# Patient Record
Sex: FEMALE | Race: WHITE
[De-identification: names, ages, dates, MRNs, and addresses within clinical notes are randomized per-mention and may not be internally consistent; named-entity substitution may affect disease eponyms.]

---

## 2017-06-01 ENCOUNTER — HOSPITAL ENCOUNTER (EMERGENCY)
Dept: HOSPITAL 25 - ED | Age: 77
Discharge: HOME | End: 2017-06-01
Payer: MEDICARE

## 2017-06-01 VITALS — DIASTOLIC BLOOD PRESSURE: 70 MMHG | SYSTOLIC BLOOD PRESSURE: 109 MMHG
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
## 2017-06-01 DIAGNOSIS — Z88.0: ICD-10-CM

## 2017-06-01 DIAGNOSIS — Z87.891: ICD-10-CM

## 2017-06-01 DIAGNOSIS — R73.03: ICD-10-CM

## 2017-06-01 DIAGNOSIS — Z90.10: ICD-10-CM
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
## 2017-06-01 DIAGNOSIS — S05.02XA: Primary | ICD-10-CM

## 2017-06-01 DIAGNOSIS — M81.0: ICD-10-CM

## 2017-06-01 DIAGNOSIS — Z85.3: ICD-10-CM

## 2017-06-01 DIAGNOSIS — I10: ICD-10-CM

## 2017-06-01 PROCEDURE — 99281 EMR DPT VST MAYX REQ PHY/QHP: CPT

## 2017-06-01 NOTE — ED
Throat Pain/Nasal Congestion





- HPI Summary


HPI Summary: 


Pt here w/ Lt eye irritation since 20:00 last night. Was weeding her garden 

when she felt a piece of dirt fly into her eye. Has had irritation here since - 

feels like something is in there. 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 

her vision is - can't keep eye open long enough to tell. Tried flushing with 

glaucoma drops she has at home. Otherwise, wears glasses. Seasonal allergies - 

has been sneezing w/ rhinorrhea during this spring season. Denies fever, chills

, HA, neck pain, HA.





- History of Current Complaint


Chief Complaint: EDEyeProblem


Time Seen by Provider: 06/01/17 08:32


Hx Obtained From: Patient





- Allergies/Home Medications


Allergies/Adverse Reactions: 


 Allergies











Allergy/AdvReac Type Severity Reaction Status Date / Time


 


Penicillins Allergy Mild Rash Verified 06/01/17 08:29














PMH/Surg Hx/FS Hx/Imm Hx


Previously Healthy: Yes


Endocrine/Hematology History: Reports: Hx Diabetes - "pre-diabetes"


   Denies: Hx Anticoagulant Therapy


Cardiovascular History: Reports: Hx Hypertension


Musculoskeletal History: Reports: Hx Osteoporosis


Sensory History: Reports: Hx Contacts or Glasses, Hx Glaucoma - followed by 

[facility]


Opthamlomology History: Reports: Hx Contacts or Glasses





- Cancer History


Cancer Type, Location and Year: Breast CA.


Hx Chemotherapy: No


Hx Radiation Therapy: No





- Surgical History


Surgery Procedure, Year, and Place: MASTECTOMY


Infectious Disease History: No


Infectious Disease History: 


   Denies: Hx of Known/Suspected MRSA, Traveled Outside the [place] in Last 30 Days





- Social History


Occupation: Employed Full-time - elected official


Alcohol Use: Daily


Alcohol Amount: 2 DRINKS


Hx Substance Use: No


Substance Use Type: Reports: None


Hx Tobacco Use: Yes


Smoking Status (MU): Former Smoker





Review of Systems


Constitutional: Negative


Eyes: Other - see HPI


ENT: Negative


Negative: Chest Pain


Negative: Shortness Of Breath


Positive: no symptoms reported


Negative: Rash


Neurological: Negative


Psychological: Normal


All Other Systems Reviewed And Are Negative: Yes





Physical Exam


Triage Information Reviewed: Yes


Vital Signs On Initial Exam: 


 Initial Vitals











Temp Pulse Resp BP Pulse Ox


 


 96.8 F   83   20   155/62   98 


 


 06/01/17 08:18  06/01/17 08:18  06/01/17 08:18  06/01/17 08:18  06/01/17 08:18











Vital Signs Reviewed: Yes


Appearance: Positive: Well-Appearing, Well-Nourished, Pain Distress - pt is 

resting in stretcher w/ Lt eye closed - watering at times - she blots dry 

intermittently


Skin: Positive: Warm, Dry - lids of Lt eye are w/ mild erythema compared to Rt


Head/Face: Positive: Normal Head/Face Inspection


Eyes: Positive: EOMI, DAVID, Conjunctiva Inflammed, Discharge - watery from Lt, 

Other: - no russell FB observed - fluoresceine test reveals a 2mm superficial 

abrasion to the superior aspect of the cornea  - no other areas of abrasion - (-

) Siedel's sign


ENT: Positive: Normal ENT inspection, Hearing grossly normal, Pharynx normal


Neck: Positive: Supple, Nontender


Respiratory/Lung Sounds: Positive: Breath Sounds Present


Cardiovascular: Positive: Normal


Musculoskeletal: Positive: Normal, Strength/ROM Intact


Neurological: Positive: Normal, Sensory/Motor Intact, Alert, Oriented to Person 

Place, Time, CN Intact II-III


Psychiatric: Positive: Normal





Procedures





- Eye Procedure


Alcaine Drops Administered: No - tetracaine - relieved pain


Eye Irrigated w/ Saline (ccs): 2 - see PE for details





Diagnostics





- Vital Signs


 Vital Signs











  Temp Pulse Resp BP Pulse Ox


 


 06/01/17 08:31   75    95


 


 06/01/17 08:30     145/57 


 


 06/01/17 08:21  96.9 F  78  20  155/62  97


 


 06/01/17 08:18  96.8 F  83  20  155/62  98














- Laboratory


Lab Statement: Any lab studies that have been ordered have been reviewed, and 

results considered in the medical decision making process.





Re-Evaluation





- Re-Evaluation


  ** First Eval


Change: Improved





EENT Course/Dx





- Diagnoses


Provider Diagnoses: 


 Left corneal abrasion








Discharge





- Discharge Plan


Condition: Stable


Disposition: HOME


Prescriptions: 


Erythromycin OPHTH.OINT* [Ilotycin OPHTH.OINT*] 1 applic LEFT EYE QID #1 tube


Patient Education Materials:  Corneal Abrasion  (ED)


Referrals: 


Ben Joseph MD [Medical Doctor] - 


Additional Instructions: 


Apply antibiotic eye ointment 4-6 x day. You may rinse with saline prior to 

each antibiotic application. Do not wear contacts or eye makeup until cleared 

to do so by eye specialist. Follow-up with Dr. Joseph later today or tomorrow 

for recheck.


*If you develop acute change in vision, swelling, pain with eye movement, fever

, headache, neck pain, return to ED

## 2017-11-13 ENCOUNTER — HOSPITAL ENCOUNTER (EMERGENCY)
Dept: HOSPITAL 25 - ED | Age: 77
Discharge: HOME | End: 2017-11-13
Payer: MEDICARE

## 2017-11-13 VITALS — SYSTOLIC BLOOD PRESSURE: 132 MMHG | DIASTOLIC BLOOD PRESSURE: 77 MMHG

## 2017-11-13 DIAGNOSIS — N39.0: Primary | ICD-10-CM

## 2017-11-13 DIAGNOSIS — Z87.891: ICD-10-CM

## 2017-11-13 PROCEDURE — 87086 URINE CULTURE/COLONY COUNT: CPT

## 2017-11-13 PROCEDURE — 81003 URINALYSIS AUTO W/O SCOPE: CPT

## 2017-11-13 PROCEDURE — 81015 MICROSCOPIC EXAM OF URINE: CPT

## 2017-11-13 PROCEDURE — 99282 EMERGENCY DEPT VISIT SF MDM: CPT

## 2017-11-13 NOTE — ED
Zulma DELGADO Nilda, scribed for James Fonseca MD on 11/13/17 at 1333 .





GI/ HPI





- HPI Summary


HPI Summary: 


This patient is a 77 year old F presenting to [facility] with a chief complaint of 

constant urinary urgency for the past two weeks. The patient rates the pain 0/

10 in severity. Symptoms aggravated and alleviated by nothing. Patient reports 

urinary frequency, but denies urinary pain, fever, and chills. She states she 

has a bladder infection and was previously treated for bladder infection with 

Sulfa pills. PSHx mascectomy and hysterectomy. PMHx cervical and breast cancer, 

and inguinal hernia.





- History of Current Complaint


Chief Complaint: EDUrogenitalProblems


Time Seen by Provider: 11/13/17 13:21


Stated Complaint: UNABLE TO HOLD URINE


Hx Obtained From: Patient


Onset/Duration: Started Weeks Ago - 2 weeks, Still Present


Timing: Constant, Lasting Weeks


Pain Intensity: 0


Additional Signs & Symptoms: Positive: Other: - urinary urgency and frequency; 

negative urinary pain, fever, chills


Aggravating Factor(s): Nothing


Alleviating Factor(s): Nothing





- Allergy/Home Medications


Allergies/Adverse Reactions: 


 Allergies











Allergy/AdvReac Type Severity Reaction Status Date / Time


 


Penicillins Allergy Mild Rash Verified 06/01/17 08:29














PMH/Surg Hx/FS Hx/Imm Hx


Endocrine/Hematology History: Reports: Hx Diabetes - "pre-diabetes"


   Denies: Hx Anticoagulant Therapy


Cardiovascular History: Reports: Hx Hypertension


GI History: Reports: Other GI Disorders - inguinal hernia


Musculoskeletal History: Reports: Hx Osteoporosis


Sensory History: Reports: Hx Contacts or Glasses, Hx Glaucoma - followed by 

[facility]


Opthamlomology History: Reports: Hx Contacts or Glasses, Hx Glaucoma - followed 

by [facility]





- Cancer History


Cancer Type, Location and Year: Breast CA. Cervical cancer.


Hx Chemotherapy: No


Hx Radiation Therapy: No





- Surgical History


Surgery Procedure, Year, and Place: ~1997 MASTECTOMY


Infectious Disease History: No


Infectious Disease History: 


   Denies: Hx of Known/Suspected MRSA, Traveled Outside the [place] in Last 30 Days





- Family History


Known Family History: Positive: Other - cancer





- Social History


Occupation: Employed Full-time


Alcohol Use: Daily


Alcohol Amount: 2 DRINKS


Hx Substance Use: No


Substance Use Type: Reports: None


Hx Tobacco Use: Yes


Smoking Status (MU): Former Smoker





Review of Systems


Negative: Fever, Chills


Positive: frequency, urgency.  Negative: pain


All Other Systems Reviewed And Are Negative: Yes





Physical Exam


Triage Information Reviewed: Yes


Vital Signs On Initial Exam: 


 Initial Vitals











Temp Pulse Resp BP Pulse Ox


 


 97.2 F   92   20   132/77   94 


 


 11/13/17 12:40  11/13/17 12:40  11/13/17 12:40  11/13/17 12:40  11/13/17 12:40











Vital Signs Reviewed: Yes


Appearance: Positive: Well-Appearing, No Pain Distress


Skin: Positive: Warm, Skin Color Reflects Adequate Perfusion


Head/Face: Positive: Normal Head/Face Inspection


Eyes: Positive: EOMI


ENT: Positive: Normal ENT inspection


Neck: Positive: Nontender


Respiratory/Lung Sounds: Positive: Clear to Auscultation, Breath Sounds Present


Cardiovascular: Positive: RRR.  Negative: Murmur


Abdomen Description: Positive: Nontender


Musculoskeletal: Positive: Strength/ROM Intact


Neurological: Positive: Sensory/Motor Intact, Alert, Oriented to Person Place, 

Time, CN Intact II-III


Psychiatric: Positive: Normal





- Taylor Coma Scale


Best Eye Response: 4 - Spontaneous


Best Motor Response: 6 - Obeys Commands


Best Verbal Response: 5 - Oriented





Diagnostics





- Vital Signs


 Vital Signs











  Temp Pulse Resp BP Pulse Ox


 


 11/13/17 12:40  97.2 F  92  20  132/77  94














- Laboratory


Lab Statement: Any lab studies that have been ordered have been reviewed, and 

results considered in the medical decision making process.





GIGU Course/Dx





- Course


Course Of Treatment: This patient is a 77 year old F presenting to [facility] with a 

chief complaint of constant urinary urgency for the past two weeks. The patient 

rates the pain 0/10 in severity.  Symptoms aggravated and alleviated by 

nothing. Patient reports urinary frequency, but denies urinary pain, fever, and 

chills. She states she has a bladder infection and was previously treated for 

bladder infection with Sulfa pills.





- Diagnoses


Provider Diagnoses: 


 UTI (urinary tract infection)








Discharge





- Discharge Plan


Condition: Good


Disposition: HOME


Prescriptions: 


Sulfamethox/Trimethoprim DS* [Bactrim /160 TAB*] 1 tab PO BID #14 tab


Patient Education Materials:  Urinary Tract Infection in Women (ED)


Referrals: 


Stacey Marie MD [Primary Care Provider] - 2 Days





The documentation as recorded by the Zulma pike Nilda accurately 

reflects the service I personally performed and the decisions made by me, James Fonseca MD.

## 2019-06-16 ENCOUNTER — HOSPITAL ENCOUNTER (EMERGENCY)
Dept: HOSPITAL 25 - ED | Age: 79
Discharge: HOME | End: 2019-06-16
Payer: MEDICARE

## 2019-06-16 VITALS — DIASTOLIC BLOOD PRESSURE: 95 MMHG | SYSTOLIC BLOOD PRESSURE: 136 MMHG

## 2019-06-16 DIAGNOSIS — Z85.3: ICD-10-CM

## 2019-06-16 DIAGNOSIS — N39.0: Primary | ICD-10-CM

## 2019-06-16 DIAGNOSIS — Z87.891: ICD-10-CM

## 2019-06-16 DIAGNOSIS — R53.83: ICD-10-CM

## 2019-06-16 DIAGNOSIS — Z79.899: ICD-10-CM

## 2019-06-16 DIAGNOSIS — R50.9: ICD-10-CM

## 2019-06-16 DIAGNOSIS — Z88.0: ICD-10-CM

## 2019-06-16 DIAGNOSIS — R19.7: ICD-10-CM

## 2019-06-16 LAB
ALBUMIN SERPL BCG-MCNC: 4.2 G/DL (ref 3.2–5.2)
ALBUMIN/GLOB SERPL: 1.3 {RATIO} (ref 1–3)
ALP SERPL-CCNC: 77 U/L (ref 34–104)
ALT SERPL W P-5'-P-CCNC: 94 U/L (ref 7–52)
ANION GAP SERPL CALC-SCNC: 10 MMOL/L (ref 2–11)
APTT PPP: 35.3 SECONDS (ref 26–38)
AST SERPL-CCNC: 78 U/L (ref 13–39)
BASOPHILS # BLD AUTO: 0 10^3/UL (ref 0–0.2)
BUN SERPL-MCNC: 14 MG/DL (ref 6–24)
BUN/CREAT SERPL: 17.9 (ref 8–20)
CALCIUM SERPL-MCNC: 9.3 MG/DL (ref 8.6–10.3)
CHLORIDE SERPL-SCNC: 98 MMOL/L (ref 101–111)
EOSINOPHIL # BLD AUTO: 0 10^3/UL (ref 0–0.6)
GLOBULIN SER CALC-MCNC: 3.3 G/DL (ref 2–4)
GLUCOSE SERPL-MCNC: 160 MG/DL (ref 70–100)
HCO3 SERPL-SCNC: 25 MMOL/L (ref 22–32)
HCT VFR BLD AUTO: 42 % (ref 35–47)
HGB BLD-MCNC: 14.7 G/DL (ref 12–16)
INR PPP/BLD: 1.29 (ref 0.82–1.09)
LYMPHOCYTES # BLD AUTO: 0.3 10^3/UL (ref 1–4.8)
MCH RBC QN AUTO: 31 PG (ref 27–31)
MCHC RBC AUTO-ENTMCNC: 35 G/DL (ref 31–36)
MCV RBC AUTO: 89 FL (ref 80–97)
MONOCYTES # BLD AUTO: 0.5 10^3/UL (ref 0–0.8)
NEUTROPHILS # BLD AUTO: 8.9 10^3/UL (ref 1.5–7.7)
NRBC # BLD AUTO: 0 10^3/UL
NRBC BLD QL AUTO: 0.1
PLATELET # BLD AUTO: 130 10^3/UL (ref 150–450)
POTASSIUM SERPL-SCNC: 3.7 MMOL/L (ref 3.5–5)
PROT SERPL-MCNC: 7.5 G/DL (ref 6.4–8.9)
RBC # BLD AUTO: 4.77 10^6 /UL (ref 3.7–4.87)
RBC UR QL AUTO: (no result)
SODIUM SERPL-SCNC: 133 MMOL/L (ref 135–145)
VIT C UR QL: (no result)
WBC # BLD AUTO: 9.9 10^3/UL (ref 3.5–10.8)
WBC UR QL AUTO: (no result)

## 2019-06-16 PROCEDURE — 87077 CULTURE AEROBIC IDENTIFY: CPT

## 2019-06-16 PROCEDURE — 99283 EMERGENCY DEPT VISIT LOW MDM: CPT

## 2019-06-16 PROCEDURE — 74177 CT ABD & PELVIS W/CONTRAST: CPT

## 2019-06-16 PROCEDURE — 36415 COLL VENOUS BLD VENIPUNCTURE: CPT

## 2019-06-16 PROCEDURE — 87040 BLOOD CULTURE FOR BACTERIA: CPT

## 2019-06-16 PROCEDURE — 71045 X-RAY EXAM CHEST 1 VIEW: CPT

## 2019-06-16 PROCEDURE — 96365 THER/PROPH/DIAG IV INF INIT: CPT

## 2019-06-16 PROCEDURE — 86140 C-REACTIVE PROTEIN: CPT

## 2019-06-16 PROCEDURE — 85730 THROMBOPLASTIN TIME PARTIAL: CPT

## 2019-06-16 PROCEDURE — 87086 URINE CULTURE/COLONY COUNT: CPT

## 2019-06-16 PROCEDURE — 80053 COMPREHEN METABOLIC PANEL: CPT

## 2019-06-16 PROCEDURE — 83605 ASSAY OF LACTIC ACID: CPT

## 2019-06-16 PROCEDURE — 81003 URINALYSIS AUTO W/O SCOPE: CPT

## 2019-06-16 PROCEDURE — 85025 COMPLETE CBC W/AUTO DIFF WBC: CPT

## 2019-06-16 PROCEDURE — 96366 THER/PROPH/DIAG IV INF ADDON: CPT

## 2019-06-16 PROCEDURE — 81015 MICROSCOPIC EXAM OF URINE: CPT

## 2019-06-16 PROCEDURE — 85610 PROTHROMBIN TIME: CPT

## 2019-06-16 NOTE — ED
Complex/Multi-Sys Presentation





- HPI Summary


HPI Summary: 


Patient is a 80 y/o F presenting to [facility] with complaints of fatigue, fever, 

chills, unsteady gait, diarrhea and shakiness. The patient reports that she has 

slept around 12 hours each night for the past two days. She claims that she had 

a fever of 103 F at 1400 today. Patient took Tylenol, 1000 mg, at 1200, 1700 

today. She denies cough and SOB. Some diarrhea for the past two days is 

endorsed as well. Patient notes Hx of UTI. PSHx of hysterectomy, . 

Patient's friend/neighbor is present in the room. Friend states that the 

patient was "shaking uncontrollably" earlier today. On triage, pain is denied. 

Nothing is noted to aggravate/alleviate Sx. Home medications and allergies are 

reviewed.   








- History Of Current Complaint


Chief Complaint: EDFever


Time Seen by Provider: 19 20:16


Hx Obtained From: Patient


Onset/Duration: Lasting Hours - fever of 103 F at 1400 today, Lasting Days - 

two days of fatigue, diarrhea, Still Present


Timing: Constant, Hours - fever of 103 F at 1400 today, Days - two days of 

fatigue, diarrhea


Severity Currently: None


Associated Signs And Symptoms: Positive: Diarrhea, Fever, Other - positive - 

fatigue, chills, unsteady gait, shakiness.  Negative: SOB, Cough





- Allergies/Home Medications


Allergies/Adverse Reactions: 


 Allergies











Allergy/AdvReac Type Severity Reaction Status Date / Time


 


Penicillins Allergy  Rash Verified 19 21:34














PMH/Surg Hx/FS Hx/Imm Hx


Endocrine/Hematology History: Reports: Hx Diabetes - "pre-diabetes"


   Denies: Hx Anticoagulant Therapy


Cardiovascular History: Reports: Hx Hypertension


GI History: Reports: Other GI Disorders - inguinal hernia


Musculoskeletal History: Reports: Hx Osteoporosis


Sensory History: Reports: Hx Contacts or Glasses, Hx Glaucoma - followed by 

[facility]


Opthamlomology History: Reports: Hx Contacts or Glasses, Hx Glaucoma - followed 

by [facility]





- Cancer History


Cancer Type, Location and Year: Breast CA. Cervical cancer.


Hx Chemotherapy: No


Hx Radiation Therapy: No





- Surgical History


Surgery Procedure, Year, and Place: ~ MASTECTOMY


Infectious Disease History: Yes


Infectious Disease History: 


   Denies: Hx of Known/Suspected MRSA, Traveled Outside the [place] in Last 30 Days





- Family History


Known Family History: Positive: Other - cancer





- Social History


Alcohol Use: Daily


Alcohol Amount: 2 DRINKS


Hx Substance Use: No


Substance Use Type: Reports: None


Hx Tobacco Use: Yes


Smoking Status (MU): Former Smoker





Review of Systems


Constitutional: Other - positive - unsteady gait, shakiness 


Positive: Fever, Chills, Fatigue


Negative: Shortness Of Breath, Cough


Positive: Diarrhea


All Other Systems Reviewed And Are Negative: Yes





Physical Exam





- Summary


Physical Exam Summary: 


VITAL SIGNS: Reviewed.


GENERAL:  Patient is a well-developed and nourished female who is lying 

comfortable in the stretcher. Patient is not in any acute respiratory distress.


HEAD AND FACE: No signs of trauma. No ecchymosis, hematomas or skull 

depressions. No sinus tenderness.


EYES: PERRLA, EOMI x 2, No injected conjunctiva, no nystagmus.


EARS: Hearing grossly intact. Ear canals and tympanic membranes are within 

normal limits.


MOUTH: Oropharynx within normal limits.


NECK: Supple, trachea is midline, no adenopathy, no JVD, no carotid bruit, no c-

spine tenderness, neck with full ROM


CHEST: Symmetric, no tenderness at palpation


LUNGS: Clear to auscultation bilaterally. No wheezing or crackles.


CVS: Tachycardia, S1 and S2 present, no murmurs or gallops appreciated.


ABDOMEN: Soft, mild lower abdominal tenderness. No signs of distention. No 

rebound no guarding, and no masses palpated. Bowel sounds are normal.


EXTREMITIES: FROM in all major joints, no edema, no cyanosis or clubbing.


NEURO: Alert and oriented x 3. No acute neurological deficits. Speech is normal 

and follows commands.


SKIN: Dry and warm








Triage Information Reviewed: Yes


Vital Signs On Initial Exam: 


 Initial Vitals











Temp Pulse Resp BP Pulse Ox


 


 98.3 F   125   15   159/79   93 


 


 19 19:42  19 19:42  19 19:42  19 19:42  19 19:42











Vital Signs Reviewed: Yes





Diagnostics





- Vital Signs


 Vital Signs











  Temp Pulse Resp BP Pulse Ox


 


 19 19:42  98.3 F  125  15  159/79  93














- Laboratory


Result Diagrams: 


 19 20:52





 19 20:53


Lab Statement: Any lab studies that have been ordered have been reviewed, and 

results considered in the medical decision making process.





- Radiology


  ** Chest X-ray


Radiology Interpretation Completed By: ED Physician


Summary of Radiographic Findings: Chest x-ray showed no acute process, pending 

official report.





- CT


  ** CT ABD/PEL 


CT Interpretation Completed By: Radiologist


Summary of CT Findings: IMPRESSION:  1. Midline abdominal small bowel 

containing hernia. No strangulation or.  obstruction. No additional findings to 

correlate with patient's symptomatology.  2. Distal colonic diverticulosis.  

This report was reviewed by Dr. Hodgson.





Re-Evaluation





- Re-Evaluation


  ** First Eval


Re-Evaluation Time: 22:58


Change: Improved


Comment: 2258 - Patient reports improvement in Sx. Results of labs and tests 

were discussed with the patient. No nausea reported. She will be discharged to 

home with PO antibiotics and follow up with PCP within three days. Patient is 

agreeable with this plan. Patient is hemodynamically stable and afebrile. 

Strict return precautions given.





Complex Multi-Symp Course/Dx


Course Of Treatment: Patient is a 80 y/o F presenting to [facility] with complaints 

of fatigue, fever, chills, unsteady gait, diarrhea and shakiness. The patient 

reports that she has slept around 12 hours each night for the past two days. 

She claims that she had a fever of 103 F at 1400 today. Patient took Tylenol, 

1000 mg, at 1200, 1700 today. She denies cough and SOB. Some diarrhea for the 

past two days is endorsed as well. Patient notes Hx of UTI. PSHx of hysterectomy

, . Patient's friend/neighbor is present in the room. Friend states 

that the patient was "shaking uncontrollably" earlier today. On physical exam, 

mild lower abdominal tenderness and tachycardia is noted.  UA showed 2+ protein

, trace ketones, 2+ blood, trace leukocyte esterase, 3+ WBC, 3+ RBC, present 

squamous and transition epith cells, hyaline and granular casts present, 

ascorbic acid present.  Labs showed plt count 130, absolute neuts 8.9, absolute 

lymphs 0.3, INR 1.29, sodium 133, chloride 98, glucose 160, lactic acid 0.9, 

total bilirubin 1.1, AST 78, ALT 94, .2.  CXR was NAD.  CT ABD/PEL 

IMPRESSION:  1. Midline abdominal small bowel containing hernia. No 

strangulation or.  obstruction. No additional findings to correlate with patient

's symptomatology.  2. Distal colonic diverticulosis.  During ED course, 

patient received fluids, Levaquin 750 mg in 150 mls @ 100 mls/hr IVPB ED.  

Patient was found to have UTI, most likely reason for her fever.  2258 - 

Patient reports improvement in Sx. Results of labs and tests were discussed 

with the patient. No nausea reported. She will be discharged to home with PO 

antibiotics and follow up with PCP within three days. Patient is agreeable with 

this plan. Patient is hemodynamically stable and afebrile. Strict return 

precautions given.





- Diagnoses


Provider Diagnoses: 


 UTI (urinary tract infection)








Discharge





- Sign-Out/Discharge


Documenting (check all that apply): Patient Departure - discharge


Patient Received Moderate/Deep Sedation with Procedure: No





- Discharge Plan


Condition: Stable


Disposition: HOME


Prescriptions: 


Levofloxacin TAB* [Levaquin TAB*] 500 mg PO DAILY #10 tab


Patient Education Materials:  Urinary Tract Infection in Women (ED)


Referrals: 


Stacey Marie MD [Primary Care Provider] - 2 Days


Additional Instructions: 


PLEASE RETURN TO THE ED IMMEDIATELY FOR WORSENING OR CONCERNING SYMPTOMS. 

FOLLOW UP WITH YOUR PRIMARY CARE PHYSICIAN WITHIN THREE DAYS. TAKE TYLENOL FOR 

FEVER. 





- Attestation Statements


Document Initiated by Scribe: Yes


Documenting Scribe: AKI ZUNIGA


Provider For Whom Pilariblizzy is Documenting (Include Credential): YURI HODGSON MD


Scribe Attestation: 


AKI DELGADO, scribed for YURI HODGSON MD on 19 at 2300. 


Status of Scribe Document: Ready